# Patient Record
Sex: FEMALE | Race: WHITE | NOT HISPANIC OR LATINO
[De-identification: names, ages, dates, MRNs, and addresses within clinical notes are randomized per-mention and may not be internally consistent; named-entity substitution may affect disease eponyms.]

---

## 2021-12-02 ENCOUNTER — APPOINTMENT (OUTPATIENT)
Dept: PULMONOLOGY | Facility: CLINIC | Age: 71
End: 2021-12-02
Payer: MEDICARE

## 2021-12-02 VITALS — BODY MASS INDEX: 29.02 KG/M2 | WEIGHT: 170 LBS | HEIGHT: 64 IN

## 2021-12-02 DIAGNOSIS — E03.9 HYPOTHYROIDISM, UNSPECIFIED: ICD-10-CM

## 2021-12-02 DIAGNOSIS — E78.5 HYPERLIPIDEMIA, UNSPECIFIED: ICD-10-CM

## 2021-12-02 DIAGNOSIS — Z87.891 PERSONAL HISTORY OF NICOTINE DEPENDENCE: ICD-10-CM

## 2021-12-02 DIAGNOSIS — I25.10 ATHEROSCLEROTIC HEART DISEASE OF NATIVE CORONARY ARTERY W/OUT ANGINA PECTORIS: ICD-10-CM

## 2021-12-02 PROBLEM — Z00.00 ENCOUNTER FOR PREVENTIVE HEALTH EXAMINATION: Status: ACTIVE | Noted: 2021-12-02

## 2021-12-02 PROCEDURE — 99203 OFFICE O/P NEW LOW 30 MIN: CPT

## 2021-12-02 RX ORDER — LANSOPRAZOLE 30 MG/1
CAPSULE, DELAYED RELEASE ORAL
Refills: 0 | Status: ACTIVE | COMMUNITY

## 2021-12-02 RX ORDER — LEVOTHYROXINE SODIUM 0.17 MG/1
TABLET ORAL
Refills: 0 | Status: ACTIVE | COMMUNITY

## 2021-12-02 RX ORDER — CLOPIDOGREL 75 MG/1
TABLET, FILM COATED ORAL
Refills: 0 | Status: ACTIVE | COMMUNITY

## 2021-12-02 RX ORDER — ASPIRIN 81 MG
81 TABLET, DELAYED RELEASE (ENTERIC COATED) ORAL
Refills: 0 | Status: ACTIVE | COMMUNITY

## 2021-12-02 RX ORDER — LEVOTHYROXINE SODIUM 62.5 UG/1
CAPSULE ORAL
Refills: 0 | Status: ACTIVE | COMMUNITY

## 2021-12-02 NOTE — HISTORY OF PRESENT ILLNESS
[FreeTextEntry1] : Dr. Mcgee\par Raymond Maddox\par 71 year old woman  with history of hypothyroidism, cad s.p stents  is here in the sleep center to address excessive snoring and restless sleep.  Patient is sleepy with [place] sleepiness score of 12.  Patient has very loud snoring which disturbs her , also has witnessed apneas.  Patient's bedtime is around 11 PM wakes up in the morning around 6 AM.  She feels rested when she wakes up.  Patient does not drink coffee.  Patient does not have any headaches or nocturia.  She is not sleepy while driving.\par \par she had a sleep study in 2017 which showed mild apnea and is not on treatment.\par

## 2021-12-02 NOTE — PHYSICAL EXAM
[General Appearance - Well Developed] : well developed [General Appearance - Well Nourished] : well nourished [Low Lying Soft Palate] : low lying soft palate [Enlarged Base of the Tongue] : enlargement of the base of the tongue [IV] : IV [Heart Sounds] : normal S1 and S2 [Murmurs] : no murmurs [] : no respiratory distress [Auscultation Breath Sounds / Voice Sounds] : lungs were clear to auscultation bilaterally [No Focal Deficits] : no focal deficits [Oriented To Time, Place, And Person] : oriented to person, place, and time [Memory Recent] : recent memory was not impaired

## 2021-12-02 NOTE — ASSESSMENT
[FreeTextEntry1] : 71-year-old woman with history of mild obstructive sleep apnea diagnosed many years ago and is not on treatment of the time.  Her symptoms have gotten worse and is here for evaluation.\par \par We will do home study to assess the current degree of sleep apnea.  We discussed about treatment options with the patient today.

## 2023-01-26 ENCOUNTER — APPOINTMENT (OUTPATIENT)
Dept: PULMONOLOGY | Facility: CLINIC | Age: 73
End: 2023-01-26
Payer: MEDICARE

## 2023-01-26 VITALS
DIASTOLIC BLOOD PRESSURE: 70 MMHG | HEIGHT: 64 IN | BODY MASS INDEX: 29.02 KG/M2 | SYSTOLIC BLOOD PRESSURE: 120 MMHG | WEIGHT: 170 LBS | HEART RATE: 66 BPM

## 2023-01-26 DIAGNOSIS — G47.33 OBSTRUCTIVE SLEEP APNEA (ADULT) (PEDIATRIC): ICD-10-CM

## 2023-01-26 PROCEDURE — 99213 OFFICE O/P EST LOW 20 MIN: CPT

## 2023-01-26 RX ORDER — EVOLOCUMAB 140 MG/ML
INJECTION, SOLUTION SUBCUTANEOUS
Refills: 0 | Status: ACTIVE | COMMUNITY

## 2023-01-26 NOTE — PHYSICAL EXAM
Incoming refill request for: Adderall  Per PDMP last dispensed on: no access for this provider  Last seen by: Macario Huerta NP on:05/13/19  Future appointment to see PCP on: nothing scheduled  Active medication agreement updated on: 3/16/18  Last Drug Screen Collected on: 3/16/18    Script pended, with a start date of: 8/27/19    PDMP review:    Criteria met. Forwarded to Physician/RICKEY for signature. [General Appearance - Well Developed] : well developed [General Appearance - Well Nourished] : well nourished [Low Lying Soft Palate] : low lying soft palate [Enlarged Base of the Tongue] : enlargement of the base of the tongue [IV] : IV [Heart Sounds] : normal S1 and S2 [Murmurs] : no murmurs [] : no respiratory distress [Auscultation Breath Sounds / Voice Sounds] : lungs were clear to auscultation bilaterally [No Focal Deficits] : no focal deficits [Oriented To Time, Place, And Person] : oriented to person, place, and time [Memory Recent] : recent memory was not impaired

## 2023-01-26 NOTE — HISTORY OF PRESENT ILLNESS
[FreeTextEntry1] : Dr. Mcgee\par Raymond Maddox\par 72 year old woman  with history of hypothyroidism, cad s.p stents  is here in the sleep center to address sleep apnea.  Patient is not that sleepy with [place] sleepiness score of 10.  Patient has very loud snoring which disturbs her , also has witnessed apneas.  Patient's bedtime is around 11 PM wakes up in the morning around 6 AM.  She feels rested when she wakes up.  Patient does not drink coffee.  Patient does not have any headaches or nocturia.  She is not sleepy while driving. She wakes up with dry mouth.\par \par she had a sleep study in 2021 which showed mild apnea and is not on treatment. Her symptoms have gotten worse, will repeat a study to assess.\par

## 2023-01-26 NOTE — ASSESSMENT
[FreeTextEntry1] : 72 -year-old woman with history of mild obstructive sleep apnea ,  her symptoms have gotten worse and is here for evaluation.\par \par We will do sleep study to assess the current degree of sleep apnea.  WIll order CPAP after the sleep study is done.

## 2023-05-15 ENCOUNTER — OFFICE (OUTPATIENT)
Dept: URBAN - METROPOLITAN AREA CLINIC 29 | Facility: CLINIC | Age: 73
Setting detail: OPHTHALMOLOGY
End: 2023-05-15
Payer: MEDICARE

## 2023-05-15 DIAGNOSIS — H25.13: ICD-10-CM

## 2023-05-15 DIAGNOSIS — H02.831: ICD-10-CM

## 2023-05-15 DIAGNOSIS — H16.223: ICD-10-CM

## 2023-05-15 DIAGNOSIS — H02.834: ICD-10-CM

## 2023-05-15 PROCEDURE — 92014 COMPRE OPH EXAM EST PT 1/>: CPT | Performed by: OPHTHALMOLOGY

## 2023-05-15 ASSESSMENT — REFRACTION_CURRENTRX
OD_SPHERE: +4.00
OS_CYLINDER: +0.50
OS_OVR_VA: 20/
OD_CYLINDER: +0.25
OD_SPHERE: +2.75
OS_AXIS: 172
OS_ADD: +1.75
OS_CYLINDER: +0.25
OD_ADD: +1.75
OS_AXIS: 170
OS_OVR_VA: 20/
OD_CYLINDER: +0.50
OD_OVR_VA: 20/
OS_SPHERE: +2.25
OD_AXIS: 5
OS_SPHERE: +3.50
OD_OVR_VA: 20/
OS_VPRISM_DIRECTION: PROGS
OD_AXIS: 005
OD_VPRISM_DIRECTION: PROGS

## 2023-05-15 ASSESSMENT — REFRACTION_MANIFEST
OD_AXIS: 15
OS_VA1: 20/20-1
OD_CYLINDER: +2.25
OD_VA1: 20/20-2
OS_AXIS: 175
OD_SPHERE: +1.50
OS_CYLINDER: +0.75
OD_CYLINDER: +0.75
OS_VA1: 20/30
OS_AXIS: 175
OD_AXIS: 10
OS_SPHERE: +1.50
OD_ADD: +2.50
OD_SPHERE: +1.75
OS_ADD: +2.50
OS_CYLINDER: +1.75
OS_SPHERE: +1.75
OD_VA1: 20/30

## 2023-05-15 ASSESSMENT — AXIALLENGTH_DERIVED
OD_AL: 22.6488
OS_AL: 22.7389
OS_AL: 23.0137
OS_AL: 22.7389
OD_AL: 23.0137
OD_AL: 22.6488

## 2023-05-15 ASSESSMENT — KERATOMETRY
OS_AXISANGLE_DEGREES: 173
OD_K2POWER_DIOPTERS: 44.00
OD_K1POWER_DIOPTERS: 42.25
OD_AXISANGLE_DEGREES: 004
OS_K2POWER_DIOPTERS: 43.75
OS_K1POWER_DIOPTERS: 42.50

## 2023-05-15 ASSESSMENT — CONFRONTATIONAL VISUAL FIELD TEST (CVF)
OD_FINDINGS: FULL
OS_FINDINGS: FULL

## 2023-05-15 ASSESSMENT — SPHEQUIV_DERIVED
OD_SPHEQUIV: 2.875
OS_SPHEQUIV: 2.625
OS_SPHEQUIV: 2.625
OD_SPHEQUIV: 2.875
OS_SPHEQUIV: 1.875
OD_SPHEQUIV: 1.875

## 2023-05-15 ASSESSMENT — REFRACTION_AUTOREFRACTION
OS_CYLINDER: +1.75
OD_CYLINDER: +2.25
OS_AXIS: 171
OD_AXIS: 008
OD_SPHERE: +1.75
OS_SPHERE: +1.75

## 2023-05-15 ASSESSMENT — TONOMETRY
OS_IOP_MMHG: 15
OD_IOP_MMHG: 15

## 2023-05-15 ASSESSMENT — LID POSITION - DERMATOCHALASIS
OD_DERMATOCHALASIS: RUL 2+
OS_DERMATOCHALASIS: LUL 2+

## 2023-05-15 ASSESSMENT — VISUAL ACUITY
OS_BCVA: 20/30-
OD_BCVA: 20/30-

## 2024-05-16 ENCOUNTER — NON-APPOINTMENT (OUTPATIENT)
Age: 74
End: 2024-05-16

## 2024-07-24 ENCOUNTER — OFFICE (OUTPATIENT)
Dept: URBAN - METROPOLITAN AREA CLINIC 29 | Facility: CLINIC | Age: 74
Setting detail: OPHTHALMOLOGY
End: 2024-07-24
Payer: MEDICARE

## 2024-07-24 DIAGNOSIS — H02.834: ICD-10-CM

## 2024-07-24 DIAGNOSIS — H25.13: ICD-10-CM

## 2024-07-24 DIAGNOSIS — H16.223: ICD-10-CM

## 2024-07-24 DIAGNOSIS — H02.831: ICD-10-CM

## 2024-07-24 PROCEDURE — 92014 COMPRE OPH EXAM EST PT 1/>: CPT | Performed by: OPHTHALMOLOGY

## 2024-07-24 ASSESSMENT — LID POSITION - DERMATOCHALASIS
OS_DERMATOCHALASIS: LUL 2+
OD_DERMATOCHALASIS: RUL 2+

## 2024-07-24 ASSESSMENT — CONFRONTATIONAL VISUAL FIELD TEST (CVF)
OD_FINDINGS: FULL
OS_FINDINGS: FULL

## 2024-08-05 ENCOUNTER — OFFICE (OUTPATIENT)
Dept: URBAN - METROPOLITAN AREA CLINIC 29 | Facility: CLINIC | Age: 74
Setting detail: OPHTHALMOLOGY
End: 2024-08-05

## 2024-08-05 PROCEDURE — MDRCRDRCRD MEDICAL RECORDS: Performed by: OPHTHALMOLOGY

## 2025-02-24 ENCOUNTER — TRANSCRIPTION ENCOUNTER (OUTPATIENT)
Age: 75
End: 2025-02-24

## 2025-03-20 ENCOUNTER — TRANSCRIPTION ENCOUNTER (OUTPATIENT)
Age: 75
End: 2025-03-20

## 2025-04-10 ENCOUNTER — TRANSCRIPTION ENCOUNTER (OUTPATIENT)
Age: 75
End: 2025-04-10

## 2025-05-23 ENCOUNTER — NON-APPOINTMENT (OUTPATIENT)
Age: 75
End: 2025-05-23

## 2025-05-23 ENCOUNTER — TRANSCRIPTION ENCOUNTER (OUTPATIENT)
Age: 75
End: 2025-05-23

## 2025-05-27 ENCOUNTER — TRANSCRIPTION ENCOUNTER (OUTPATIENT)
Age: 75
End: 2025-05-27

## 2025-05-27 ENCOUNTER — APPOINTMENT (OUTPATIENT)
Dept: NEUROLOGY | Facility: CLINIC | Age: 75
End: 2025-05-27
Payer: MEDICARE

## 2025-05-27 VITALS
HEART RATE: 76 BPM | OXYGEN SATURATION: 96 % | DIASTOLIC BLOOD PRESSURE: 70 MMHG | SYSTOLIC BLOOD PRESSURE: 107 MMHG | HEIGHT: 64 IN | WEIGHT: 150 LBS | RESPIRATION RATE: 16 BRPM | BODY MASS INDEX: 25.61 KG/M2

## 2025-05-27 DIAGNOSIS — R41.3 OTHER AMNESIA: ICD-10-CM

## 2025-05-27 DIAGNOSIS — E20.9 HYPOPARATHYROIDISM, UNSPECIFIED: ICD-10-CM

## 2025-05-27 DIAGNOSIS — R47.89 OTHER SPEECH DISTURBANCES: ICD-10-CM

## 2025-05-27 PROCEDURE — 99205 OFFICE O/P NEW HI 60 MIN: CPT

## 2025-05-27 RX ORDER — TIRZEPATIDE 10 MG/.5ML
INJECTION, SOLUTION SUBCUTANEOUS
Refills: 0 | Status: ACTIVE | COMMUNITY

## 2025-05-27 RX ORDER — CALCITRIOL 0.25 UG/1
0.25 CAPSULE, LIQUID FILLED ORAL TWICE DAILY
Refills: 0 | Status: ACTIVE | COMMUNITY

## 2025-05-27 RX ORDER — CETIRIZINE HCL 5 MG
5 TABLET ORAL DAILY
Refills: 0 | Status: ACTIVE | COMMUNITY

## 2025-05-27 RX ORDER — BLOOD SUGAR DIAGNOSTIC
100 STRIP MISCELLANEOUS
Refills: 0 | Status: ACTIVE | COMMUNITY

## 2025-05-27 RX ORDER — BEMPEDOIC ACID 180 MG/1
180 TABLET, FILM COATED ORAL
Refills: 0 | Status: ACTIVE | COMMUNITY

## 2025-05-27 RX ORDER — MULTIVIT-MIN/FOLIC/VIT K/LYCOP 400-300MCG
50 MCG TABLET ORAL DAILY
Refills: 0 | Status: ACTIVE | COMMUNITY

## 2025-05-27 RX ORDER — ASCORBIC ACID 125 MG
500 TABLET,CHEWABLE ORAL
Refills: 0 | Status: ACTIVE | COMMUNITY

## 2025-05-27 RX ORDER — VALACYCLOVIR 500 MG/1
500 TABLET, FILM COATED ORAL DAILY
Refills: 0 | Status: ACTIVE | COMMUNITY

## 2025-05-27 RX ORDER — LIOTHYRONINE SODIUM 5 UG/1
5 TABLET ORAL DAILY
Refills: 0 | Status: ACTIVE | COMMUNITY

## 2025-05-27 RX ORDER — LEVOTHYROXINE SODIUM 88 UG/1
88 CAPSULE ORAL DAILY
Refills: 0 | Status: ACTIVE | COMMUNITY

## 2025-05-27 RX ORDER — TRETINOIN 1 MG/G
CREAM TOPICAL
Refills: 0 | Status: ACTIVE | COMMUNITY

## 2025-05-28 LAB
25(OH)D3 SERPL-MCNC: 52.5 NG/ML
FOLATE SERPL-MCNC: 11.3 NG/ML
T4 FREE SERPL-MCNC: 1.6 NG/DL
TSH SERPL-ACNC: 0.63 UIU/ML
VIT B12 SERPL-MCNC: 752 PG/ML

## 2025-05-29 ENCOUNTER — NON-APPOINTMENT (OUTPATIENT)
Age: 75
End: 2025-05-29

## 2025-05-29 RX ORDER — NITROGLYCERIN 0.6 MG/1
TABLET SUBLINGUAL
Refills: 0 | Status: ACTIVE | COMMUNITY

## 2025-06-07 ENCOUNTER — RESULT REVIEW (OUTPATIENT)
Age: 75
End: 2025-06-07

## 2025-07-11 PROBLEM — Z79.899 OTHER LONG TERM (CURRENT) DRUG THERAPY: Status: ACTIVE | Noted: 2025-07-11

## 2025-07-17 ENCOUNTER — APPOINTMENT (OUTPATIENT)
Dept: NEUROLOGY | Facility: CLINIC | Age: 75
End: 2025-07-17
Payer: MEDICARE

## 2025-07-17 VITALS
SYSTOLIC BLOOD PRESSURE: 109 MMHG | OXYGEN SATURATION: 96 % | WEIGHT: 150 LBS | HEART RATE: 70 BPM | BODY MASS INDEX: 25.75 KG/M2 | DIASTOLIC BLOOD PRESSURE: 75 MMHG

## 2025-07-17 PROCEDURE — 99214 OFFICE O/P EST MOD 30 MIN: CPT

## 2025-07-17 RX ORDER — DEXLANSOPRAZOLE 60 MG/1
60 CAPSULE, DELAYED RELEASE ORAL
Refills: 0 | Status: ACTIVE | COMMUNITY

## 2025-07-17 RX ORDER — HYOSCYAMINE SULFATE 0.15 MG
TABLET ORAL
Refills: 0 | Status: ACTIVE | COMMUNITY

## 2025-07-17 RX ORDER — SAW/PYGEUM/BETA/HERB/D3/B6/ZN 30 MG-25MG
CAPSULE ORAL
Refills: 0 | Status: ACTIVE | COMMUNITY

## 2025-07-30 ENCOUNTER — TRANSCRIPTION ENCOUNTER (OUTPATIENT)
Age: 75
End: 2025-07-30

## 2025-08-08 ENCOUNTER — TRANSCRIPTION ENCOUNTER (OUTPATIENT)
Age: 75
End: 2025-08-08